# Patient Record
Sex: MALE | Race: WHITE | NOT HISPANIC OR LATINO | URBAN - METROPOLITAN AREA
[De-identification: names, ages, dates, MRNs, and addresses within clinical notes are randomized per-mention and may not be internally consistent; named-entity substitution may affect disease eponyms.]

---

## 2022-06-29 ENCOUNTER — OFFICE VISIT (OUTPATIENT)
Dept: PODIATRY | Facility: CLINIC | Age: 57
End: 2022-06-29
Payer: COMMERCIAL

## 2022-06-29 VITALS
SYSTOLIC BLOOD PRESSURE: 122 MMHG | WEIGHT: 264 LBS | BODY MASS INDEX: 41.44 KG/M2 | DIASTOLIC BLOOD PRESSURE: 78 MMHG | HEIGHT: 67 IN

## 2022-06-29 DIAGNOSIS — M76.72 PERONEAL TENDONITIS, LEFT: ICD-10-CM

## 2022-06-29 DIAGNOSIS — M79.672 LEFT FOOT PAIN: Primary | ICD-10-CM

## 2022-06-29 DIAGNOSIS — E66.01 MORBID OBESITY (H): ICD-10-CM

## 2022-06-29 DIAGNOSIS — Q66.70 PES CAVUS: ICD-10-CM

## 2022-06-29 PROCEDURE — 99203 OFFICE O/P NEW LOW 30 MIN: CPT | Performed by: PODIATRIST

## 2022-06-29 RX ORDER — LISINOPRIL 20 MG/1
TABLET ORAL
COMMUNITY
Start: 2022-06-17

## 2022-06-29 RX ORDER — LORATADINE 10 MG/1
10 TABLET ORAL DAILY
COMMUNITY

## 2022-06-29 RX ORDER — DICLOFENAC SODIUM 75 MG/1
75 TABLET, DELAYED RELEASE ORAL 2 TIMES DAILY
Qty: 28 TABLET | Refills: 0 | Status: SHIPPED | OUTPATIENT
Start: 2022-06-29 | End: 2022-07-13

## 2022-06-29 NOTE — LETTER
6/29/2022         RE: Leonel Sweet  [address] [facility] [address]        Dear Colleague,    Thank you for referring your patient, Leonel Sweet, to the [facility] POD[facility]. Please see a copy of my visit note below.    PATIENT HISTORY:   Leonel Sweet is a 57 year old male who presents to clinic for pain to the left foot.  Notes it is on the outside of his foot.  Has been going on for 6 months.  Notes that it is sore when he gets up or after he has been on long walks.  He is tried new shoes but it has not helped.  Notes is starting on the right foot as well.  States the pain can be 6 out of 10 at times.  He is wondering what is causing the pain and what can be done for it.  Denies specific injury.    Review of Systems:  Patient denies fever, chills, rash, wound, stiffness, numbness, weakness, heart burn, blood in stool, chest pain with activity, calf pain when walking, shortness of breath with activity, chronic cough, easy bleeding/bruising, swelling of ankles, excessive thirst, fatigue, depression, anxiety.  Patient admits to limping at times.     PAST MEDICAL HISTORY: HTN     PAST SURGICAL HISTORY: No past surgical history on file.     MEDICATIONS:   Current Outpatient Medications:      cephALEXin (KEFLEX) 500 MG capsule, TK 2 CS PO BID FOR 10 DAYS, Disp: , Rfl: 0     lisinopril-hydrochlorothiazide (PRINZIDE,ZESTORETIC) 20-25 MG per tablet, TK 1 T PO ONCE D, Disp: , Rfl: 3     silver sulfADIAZINE (SILVADENE) 1 % cream, Apply topically daily, Disp: 25 g, Rfl: 0     ALLERGIES:  No Known Allergies     SOCIAL HISTORY:   Social History     Socioeconomic History     Marital status:      Spouse name: Not on file     Number of children: Not on file     Years of education: Not on file     Highest education level: Not on file   Occupational History     Not on file   Tobacco Use     Smoking status: Never Smoker     Smokeless tobacco: Never Used  "  Substance and Sexual Activity     Alcohol use: Not on file     Drug use: Not on file     Sexual activity: Not on file   Other Topics Concern     Not on file   Social History Narrative     Not on file     Social Determinants of Health     Financial Resource Strain: Not on file   Food Insecurity: Not on file   Transportation Needs: Not on file   Physical Activity: Not on file   Stress: Not on file   Social Connections: Not on file   Intimate Partner Violence: Not on file   Housing Stability: Not on file        FAMILY HISTORY: No family history on file.     EXAM:Vitals: /78   Ht 1.702 m (5' 7\")   Wt 119.7 kg (264 lb)   BMI 41.35 kg/m      General appearance: Patient is alert and fully cooperative with history & exam.  No sign of distress is noted during the visit.     Psychiatric: Affect is pleasant & appropriate.  Patient appears motivated to improve health.     Respiratory: Breathing is regular & unlabored while sitting.     HEENT: Hearing is intact to spoken word.  Speech is clear.  No gross evidence of visual impairment that would impact ambulation.     Dermatologic: Skin is intact to both lower extremities without significant lesions, rash or abrasion.  No paronychia or evidence of soft tissue infection is noted.     Vascular: DP & PT pulses are intact & regular bilaterally.  No significant edema or varicosities noted.  CFT and skin temperature is normal to both lower extremities.     Neurologic: Lower extremity sensation is intact to light touch.  No evidence of weakness or contracture in the lower extremities.  No evidence of neuropathy.     Musculoskeletal: Patient is ambulatory without assistive device or brace.  Increased arch height.  Pain on palpation left fifth metatarsal base with minimal eversion and inversion.    Radiographs: Left foot xray -  I personally reviewed the xrays -no fractures are noted.  There are some degenerative changes noted to the midfoot.  There is posterior plantar heel " spurring as well as spurring at the base of the fifth metatarsal.     ASSESSMENT:    Left foot pain  Pes cavus  Morbid obesity (H)  Peroneal tendonitis, left     Medical Decision Making/Plan:  Reviewed [facility] in [facility]. Reviewed and discussed causes of tendonitis.  We discussed treatments such as immobiliation, icing, stretching, heel lifts, orthotics, physical therapy, MRI.     Given his higher arch this could be more pressure on the outside of his foot and irritating the tendon.  At this time I recommend an ankle brace and inserts shoes.  Recommend topical pain cream.  We will get baseline x-rays today.  We discussed that if pain does not improve in the next month I would recommend MRI for further assessment.    Patient risk factor: Patient is at low risk for infection.        Liante Meeks DPM, Podiatry/Foot and Ankle Surgery        Again, thank you for allowing me to participate in the care of your patient.        Sincerely,        Lianet Meeks DPM, Podiatry/Foot and Ankle Surgery

## 2022-06-29 NOTE — PROGRESS NOTES
PATIENT HISTORY:   Leonel Sweet is a 57 year old male who presents to clinic for pain to the left foot.  Notes it is on the outside of his foot.  Has been going on for 6 months.  Notes that it is sore when he gets up or after he has been on long walks.  He is tried new shoes but it has not helped.  Notes is starting on the right foot as well.  States the pain can be 6 out of 10 at times.  He is wondering what is causing the pain and what can be done for it.  Denies specific injury.    Review of Systems:  Patient denies fever, chills, rash, wound, stiffness, numbness, weakness, heart burn, blood in stool, chest pain with activity, calf pain when walking, shortness of breath with activity, chronic cough, easy bleeding/bruising, swelling of ankles, excessive thirst, fatigue, depression, anxiety.  Patient admits to limping at times.     PAST MEDICAL HISTORY: HTN     PAST SURGICAL HISTORY: No past surgical history on file.     MEDICATIONS:   Current Outpatient Medications:      cephALEXin (KEFLEX) 500 MG capsule, TK 2 CS PO BID FOR 10 DAYS, Disp: , Rfl: 0     lisinopril-hydrochlorothiazide (PRINZIDE,ZESTORETIC) 20-25 MG per tablet, TK 1 T PO ONCE D, Disp: , Rfl: 3     silver sulfADIAZINE (SILVADENE) 1 % cream, Apply topically daily, Disp: 25 g, Rfl: 0     ALLERGIES:  No Known Allergies     SOCIAL HISTORY:   Social History     Socioeconomic History     Marital status:      Spouse name: Not on file     Number of children: Not on file     Years of education: Not on file     Highest education level: Not on file   Occupational History     Not on file   Tobacco Use     Smoking status: Never Smoker     Smokeless tobacco: Never Used   Substance and Sexual Activity     Alcohol use: Not on file     Drug use: Not on file     Sexual activity: Not on file   Other Topics Concern     Not on file   Social History Narrative     Not on file     Social Determinants of Health     Financial Resource Strain: Not on file   Food  "Insecurity: Not on file   Transportation Needs: Not on file   Physical Activity: Not on file   Stress: Not on file   Social Connections: Not on file   Intimate Partner Violence: Not on file   Housing Stability: Not on file        FAMILY HISTORY: No family history on file.     EXAM:Vitals: /78   Ht 1.702 m (5' 7\")   Wt 119.7 kg (264 lb)   BMI 41.35 kg/m      General appearance: Patient is alert and fully cooperative with history & exam.  No sign of distress is noted during the visit.     Psychiatric: Affect is pleasant & appropriate.  Patient appears motivated to improve health.     Respiratory: Breathing is regular & unlabored while sitting.     HEENT: Hearing is intact to spoken word.  Speech is clear.  No gross evidence of visual impairment that would impact ambulation.     Dermatologic: Skin is intact to both lower extremities without significant lesions, rash or abrasion.  No paronychia or evidence of soft tissue infection is noted.     Vascular: DP & PT pulses are intact & regular bilaterally.  No significant edema or varicosities noted.  CFT and skin temperature is normal to both lower extremities.     Neurologic: Lower extremity sensation is intact to light touch.  No evidence of weakness or contracture in the lower extremities.  No evidence of neuropathy.     Musculoskeletal: Patient is ambulatory without assistive device or brace.  Increased arch height.  Pain on palpation left fifth metatarsal base with minimal eversion and inversion.    Radiographs: Left foot xray -  I personally reviewed the xrays -no fractures are noted.  There are some degenerative changes noted to the midfoot.  There is posterior plantar heel spurring as well as spurring at the base of the fifth metatarsal.     ASSESSMENT:    Left foot pain  Pes cavus  Morbid obesity (H)  Peroneal tendonitis, left     Medical Decision Making/Plan:  Reviewed patient's chart in [facility]. Reviewed and discussed causes of tendonitis.  We discussed " treatments such as immobiliation, icing, stretching, heel lifts, orthotics, physical therapy, MRI.     Given his higher arch this could be more pressure on the outside of his foot and irritating the tendon.  At this time I recommend an ankle brace and inserts shoes.  Recommend topical pain cream.  We will get baseline x-rays today.  We discussed that if pain does not improve in the next month I would recommend MRI for further assessment.    Patient risk factor: Patient is at low risk for infection.        Lianet Meeks DPM, Podiatry/Foot and Ankle Surgery

## 2022-06-29 NOTE — PATIENT INSTRUCTIONS
Thank you for choosing [facility] Podiatry / Foot & Ankle Surgery!    DR AGUIRRE'S CLINIC:  [facility]   [address]   [place] [address]      TRIAGE LINE: [phone]  APPOINTMENTS: [phone]  RADIOLOGY: [phone]  SET UP SURGERY: [phone]  FAX NUMBER: [phone]  BILLING QUESTIONS: [phone]       Follow up: call in 1 month if not improved.       Recommend over-the-counter Voltaren gel.  Can get this at any drugstore.  Use as needed.    Recommend super feet inserts-orange color.     ankle brace at home medical store on second floor.    [facility]  [place]  [address]   [place] [address]  Ph: [phone]  Fax: [phone] [place] ([facility])  [address]  [place] [address]  Ph: [phone]  Fax: [phone]   [place]  [address]   [place] [address]  Ph: [phone]  Fax: [phone] [place]  [address]  [place] [address]   Ph: [phone]    [place]  [address]  [place] [address]   Ph: [phone]  [place]  [address] [place] [address]   Ph: [phone]      [place]  [address]  [place] [address]  Ph: [phone]  [place]  [address]   [place] [address]  Ph: [phone]  Fax: [phone]     [facility]  [address] [place] [address]  [phone]   [address]  [phone] [place]  [address] [facility] [address]  [phone]   [place]  [address] [place] [address] [address]  [phone]   [place]  [address]  [phone] [place] [address]  [phone]   [place]  [address] [place], [place] [address]or [facility]  [phone]   [place]  [address] [facility] [address]  [phone] [place]  [address]  [phone]       TENDONITIS   Tendons are the strong fibrous portions of muscles that attach to bones and allow the muscle to move a joint when it contracts. Tendons are very strong  because they have a lot of force exerted on them. Sometimes tendons can become painful because they have suffered an acute injury, in which too much force was exerted at one time, or an overuse injury, in which a normal force was exerted too frequently or over a prolonged period of time. As a result, there is damage to the tendon and its surrounding soft tissue structures and they become inflammed. Because tendons do not have a great blood supply, they do not heal rapidly and the inflammation can become chronic.   Conservative treatment for tendinitis involves rest and anti-inflammatory measures. Ice is applied 15 minutes 2-3 times daily. Anti-inflammatory medications called NSAIDs (ibuprofen, example) can be taken provided they are used with caution, as they can lead to internal bleeding and increase the risk ofstroke and heart attack. Sometimes topical nitroglycerin is prescribed to help with pain. Often your doctor will use a special shoe or removable walking cast to immobilize the tendon, allowing it to heal without further damage from use. These devices are very useful in helping tendons heal, but they may slow you down or make you feel like your hip, knee, or back are out ofalignment. This is temporary and should go away once you are out ofthe immobilization. You should not use a walking cast when showering or driving. Another option is Platelet Rich Plasma injections. (Normally done with a Sports and Orthorapedic doctor.   If conservative measures fail, your physician may need to surgically repair the tendon by removing any chronic inflammatory tissue and sewing it back together. Sometimes it is sewn to an adjacent tendon with similar function for support and sometimes it is lengthened. . Sometimes the bones around the tendon need to be realigned or reshaped to better support the tendon or prevent further damage. Your foot and ankle surgeon will discuss the specifics of your surgery with you, should you need  it.    Towel stretch: Sit on a hard surface with your injured leg stretched out in front of you. Loop a towel around your toes and the ball of your foot and pull the towel toward your body keeping your leg straight. Hold this position for 15 to 30 seconds and then relax. Repeat 3 times. Then push the towel away with the ball of your foot. Repeat 3 times.  When you don't feel much of a stretch using the towel, you can start the standing calf stretch and the following exercises.  Standing calf stretch: Stand facing a wall with your hands on the wall at about eye level. Keep your injured leg back with your heel on the floor. Keep the other leg forward with the knee bent. Turn your back foot slightly inward (as if you were pigeon-toed). Slowly lean into the wall until you feel a stretch in the back of your calf. Hold the stretch for 15 to 30 seconds. Return to the starting position. Repeat 3 times. Do this exercise several times each day.   Standing soleus stretch: Stand facing a wall with your hands on the wall at about chest height. Keep your injured leg back with your heel on the floor. Keep the other leg forward with the knee bent. Turn your back foot slightly inward (as if you were pigeon-toed). Bend your back knee slightly and gently lean into the wall until you feel a stretch in the lower calf of your injured leg. Hold the stretch for 15 to 30 seconds. Return to the starting position. Repeat 3 times.   Achilles stretch: Stand with the ball of one foot on a stair. Reach for the step below with your heel until you feel a stretch in the arch of your foot. Hold this position for 15 to 30 seconds and then relax. Repeat 3 times.   Heel raise: Balance yourself while standing behind a chair or counter. Using the chair or counter as a support to help you, raise your body up onto your toes and hold for 5 seconds. Then slowly lower yourself down without holding onto the support. (It's OK to keep holding onto the support if  you need to.) When this exercise becomes less painful, try lowering yourself down on the injured leg only. Repeat 15 times. Do 2 sets of 15. Rest 30 seconds between sets.   Step-up: Stand with the foot of your injured leg on a support 3 to 5 inches high (like a small step or block of wood). Keep your other foot flat on the floor. Shift your weight onto the injured leg on the support. Straighten your injured leg as the other leg comes off the floor. Return to the starting position by bending your injured leg and slowly lowering your uninjured leg back to the floor. Do 2 sets of 15.   Resisted ankle eversion: Sit with both legs stretched out in front of you, with your feet about a shoulder's width apart. Tie a loop in one end of elastic tubing. Put the foot of your injured leg through the loop so that the tubing goes around the arch of that foot and wraps around the outside of the other foot. Hold onto the other end of the tubing with your hand to provide tension. Turn the foot of your injured leg up and out. Make sure you keep your other foot still so that it will allow the tubing to stretch as you move the foot of your injured leg. Return to the starting position. Do 2 sets of 15.   Balance and reach exercises: Stand next to a chair with your injured leg farther from the chair. The chair will provide support if you need it. Stand on the foot of your injured leg and bend your knee slightly. Try to raise the arch of this foot while keeping your big toe on the floor. Keep your foot in this position. With the hand that is farther away from the chair, reach forward in front of you by bending at the waist. Avoid bending your knee any more as you do this. Repeat this 10 times. To make the exercise more challenging, reach farther in front of you. Do 2 sets of 10.  the same position as above. While keeping your arch height, reach the hand that is farther away from the chair across your body toward the chair. The  farther you reach, the more challenging the exercise. Do 2 sets of 10.   Resisted ankle eversion: Sit with both legs stretched out in front of you, with your feet about a shoulder's width apart. Tie a loop in one end of elastic tubing. Put the foot of your injured leg through the loop so that the tubing goes around the arch of that foot and wraps around the outside of the other foot. Hold onto the other end of the tubing with your hand to provide tension. Turn the foot of your injured leg up and out. Make sure you keep your other foot still so that it will allow the tubing to stretch as you move the foot of your injured leg. Return to the starting position. Do 2 sets of 15.   If you have access to a wobble board, do the following exercises:  Wobble board exercises:   Stand on a wobble board with your feet shoulder width apart. Rock the board forwards and backwards 30 times, then side to side 30 times. Hold on to a chair if you need support.   Rotate the wobble board around so that the edge of the board is in contact with the floor at all times. Do this 30 times in a clockwise and then a counterclockwise direction.   Balance on the wobble board for as long as you can without letting the edges touch the floor. Try to do this for 2 minutes without touching the floor.   Rotate the wobble board in clockwise and counterclockwise circles, but do not let the edge of the board touch the floor.   When you have mastered exercises A through D, try repeating them while standing on just your injured leg.   After you are able to do these exercises on one leg, try to do them with your eyes closed. Make sure you have something nearby to support you in case you lose your balance.